# Patient Record
Sex: MALE | Race: WHITE
[De-identification: names, ages, dates, MRNs, and addresses within clinical notes are randomized per-mention and may not be internally consistent; named-entity substitution may affect disease eponyms.]

---

## 2018-08-13 ENCOUNTER — HOSPITAL ENCOUNTER (EMERGENCY)
Dept: HOSPITAL 41 - JD.ED | Age: 33
Discharge: HOME | End: 2018-08-13
Payer: SELF-PAY
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
## 2018-08-13 DIAGNOSIS — Z88.8: ICD-10-CM

## 2018-08-13 DIAGNOSIS — Z87.891: ICD-10-CM

## 2018-08-13 DIAGNOSIS — S80.02XA: ICD-10-CM

## 2018-08-13 DIAGNOSIS — S93.401A: Primary | ICD-10-CM

## 2018-08-13 PROCEDURE — 73564 X-RAY EXAM KNEE 4 OR MORE: CPT

## 2018-08-13 PROCEDURE — 73610 X-RAY EXAM OF ANKLE: CPT

## 2018-08-13 PROCEDURE — 99284 EMERGENCY DEPT VISIT MOD MDM: CPT

## 2018-08-13 PROCEDURE — 73590 X-RAY EXAM OF LOWER LEG: CPT

## 2018-08-13 NOTE — EDM.PDOC
ED HPI GENERAL MEDICAL PROBLEM





- General


Chief Complaint: Trauma


Stated Complaint: SMASHED BOTH LEG AT WORK


Time Seen by Provider: 08/13/18 18:44


Source of Information: Reports: Patient, RN Notes Reviewed





- History of Present Illness


INITIAL COMMENTS - FREE TEXT/NARRATIVE: 





32-year-old male comes in with contusion injuries of the left knee, right leg 

and probable sprain injury of the right ankle.  He was at work helping moving 

what is described as a large metal platform. I believe they were lifting it 

with some type of a forklift device and fell backwards in the wrong direction 

falling on top of his left knee, right leg. He does have medial and lateral 

right ankle pain he thinks may be more from twisting of the foot and ankle. He 

has been ambulatory, walked into the department but states there was 

considerable discomfort of the right ankle leg and to a lesser amounts the left 

knee. No injury to the hips or pelvis or anything above the waist. He states he 

did have paresthesias initially of the right leg but those have resolved. This 

incident did occur about 2 hours ago.


  ** Right Ankle


Pain Score (Numeric/FACES): 8





- Related Data


 Allergies











Allergy/AdvReac Type Severity Reaction Status Date / Time


 


calamine Allergy  Rash Verified 08/13/18 18:57














Past Medical History





- Past Health History


Medical/Surgical History: Denies Medical/Surgical History





Social & Family History





- Tobacco Use


Smoking Status *Q: Former Smoker


Used Tobacco, but Quit: Yes


Month/Year Tobacco Last Used: 08/13/17





- Recreational Drug Use


Recreational Drug Use: No





Review of Systems





- Review of Systems


Review Of Systems: See Below


Constitutional: Reports: No Symptoms


Eyes: Reports: No Symptoms


Ears: Reports: No Symptoms


Nose: Reports: No Symptoms


Respiratory: Denies: Shortness of Breath


Cardiovascular: Denies: Chest Pain


GI/Abdominal: Denies: Abdominal Pain, Nausea, Vomiting


Musculoskeletal: Reports: Leg Pain (Right leg), Joint Pain (Left knee, right 

ankle)


Skin: Reports: Bruising (Mild bruising right lateral ankle), Erythema (Left knee

, right leg)


Neurological: Reports: Numbness (Right leg, gone)





ED EXAM, GENERAL





- Physical Exam


Exam: See Below


General Appearance: Alert, Mild Distress


Eye Exam: Bilateral Eye: PERRL


Throat/Mouth: Normal Inspection


Head: Atraumatic


Neck: Supple


Respiratory/Chest: No Respiratory Distress


Extremities: Joint Swelling (Very mild swelling of the lateral aspect right 

ankle with moderate tenderness lateral ankle, non-tender medially, joint is 

stable), Leg Pain (There is moderate tenderness of the anterior right leg, 

moderate swelling of the mid right lower leg with some superficial abrasion 

injury, very mild Swelling only, not tensely swollen or painful to palpation.), 

Other (There is erythema and mild swelling of the anterior left knee with 

moderate localized tenderness, good range of motion with mild discomfort only, 

no visible effusion, upper legs pelvis hips all nontender.)


Neurological: Alert, Oriented, No Motor/Sensory Deficits





Course





- Vital Signs


Last Recorded V/S: 


 Last Vital Signs











Temp  98.6 F   08/13/18 18:30


 


Pulse  83   08/13/18 18:30


 


Resp  16   08/13/18 18:30


 


BP  158/105 H  08/13/18 18:30


 


Pulse Ox  99   08/13/18 18:30














- Orders/Labs/Meds


Orders: 


 Active Orders 24 hr











 Category Date Time Status


 


 Ankle Min 3V Rt [CR] Stat Exams  08/13/18 19:06 Taken


 


 Knee Min 4V Lt [CR] Stat Exams  08/13/18 19:07 Taken


 


 Tibia Fibula Rt [CR] Stat Exams  08/13/18 19:08 Taken











Meds: 


Medications














Discontinued Medications














Generic Name Dose Route Start Last Admin





  Trade Name Jovon  PRN Reason Stop Dose Admin


 


Acetaminophen  975 mg  08/13/18 18:44  08/13/18 18:56





  Tylenol  PO  08/13/18 18:45  975 mg





  NOW ONE   Administration





     





     





     





     


 


Ibuprofen  800 mg  08/13/18 18:44  08/13/18 18:56





  Motrin  PO  08/13/18 18:45  800 mg





  ONETIME ONE   Administration





     





     





     





     














- Re-Assessments/Exams


Free Text/Narrative Re-Assessment/Exam: 





08/13/18 19:33


X rays of the L knee, R ankle, R tib/fib neg for fx.  Discharge instr. as 

documented. 





Departure





- Departure


Time of Disposition: 19:50


Disposition: Home, Self-Care 01


Condition: Fair


Clinical Impression: 


Contusion of left knee


Qualifiers:


 Encounter type: initial encounter Qualified Code(s): S80.02XA - Contusion of 

left knee, initial encounter





Contusion of right lower leg


Qualifiers:


 Encounter type: initial encounter Qualified Code(s): S80.11XA - Contusion of 

right lower leg, initial encounter





Right ankle sprain


Qualifiers:


 Encounter type: initial encounter Involved ligament of ankle: unspecified 

ligament Qualified Code(s): S93.401A - Sprain of unspecified ligament of right 

ankle, initial encounter








- Discharge Information


Referrals: 


PCP,None [Primary Care Provider] - 


Forms:  ED Department Discharge


Additional Instructions: 


Ice packs and elevation all areas of injury but especially right lower leg to 

help reduce swelling as soon as possible to help keep that from getting more 

swollen. Ace wrap right ankle and right lower leg until pain and swelling 

resolving, you may alternate Tylenol and ibuprofen as needed for discomfort. 

Crutches, no weight bearing R leg and ankle for now.   Follow-up tomorrow with 

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 

further guidance regarding return to work plan. Return to ED as needed if 

symptoms worsening in any way.





- My Orders


Last 24 Hours: 


My Active Orders





08/13/18 19:06


Ankle Min 3V Rt [CR] Stat 





08/13/18 19:07


Knee Min 4V Lt [CR] Stat 





08/13/18 19:08


Tibia Fibula Rt [CR] Stat 














- Assessment/Plan


Last 24 Hours: 


My Active Orders





08/13/18 19:06


Ankle Min 3V Rt [CR] Stat 





08/13/18 19:07


Knee Min 4V Lt [CR] Stat 





08/13/18 19:08


Tibia Fibula Rt [CR] Stat

## 2018-08-14 NOTE — CR
Left knee:  Four views of the left knee were obtained.

 

Comparison: No previous study.

 

Medial and lateral joint compartments are maintained in height.  No 

joint effusion is seen.  No fracture or other bony abnormality is 

appreciated.

 

Impression:

1.  Unremarkable left knee exam.

 

Diagnostic code #1

## 2018-08-14 NOTE — CR
Right tibia and fibula: AP and lateral views of the right tibia and 

fibula were obtained.

 

Soft tissue swelling is seen.  Minimal calcification is noted within 

the distal Achilles tendon at the attachment to the calcaneus which is

 incidental.  No acute fracture or other bony abnormality is 

appreciated.

 

Impression:

1.  Incidental findings as noted above.  No acute bony abnormality is 

identified on two-view right tibia and fibula study.

 

Diagnostic code #2

## 2018-08-14 NOTE — CR
Right ankle:  Four views of the right ankle were obtained.

 

Comparison: No previous study.

 

Small calcification again noted within the distal Achilles tendon at 

the attachment to the calcaneus.  Ankle mortise is symmetric.  No 

fracture, dislocation or other bony abnormality is seen.

 

Impression:

1.  Small calcification at the attachment of the distal Achilles 

tendon to the calcaneus.

2.  Right ankle study is otherwise unremarkable.

 

Diagnostic code #2